# Patient Record
Sex: FEMALE | Race: WHITE | NOT HISPANIC OR LATINO | URBAN - METROPOLITAN AREA
[De-identification: names, ages, dates, MRNs, and addresses within clinical notes are randomized per-mention and may not be internally consistent; named-entity substitution may affect disease eponyms.]

---

## 2019-01-23 ENCOUNTER — EMERGENCY (EMERGENCY)
Facility: HOSPITAL | Age: 69
LOS: 0 days | Discharge: LEFT BEFORE DISCHARGE | End: 2019-01-24
Attending: EMERGENCY MEDICINE | Admitting: EMERGENCY MEDICINE
Payer: COMMERCIAL

## 2019-01-23 VITALS — HEIGHT: 67 IN | WEIGHT: 164.91 LBS

## 2019-01-23 DIAGNOSIS — E78.5 HYPERLIPIDEMIA, UNSPECIFIED: ICD-10-CM

## 2019-01-23 DIAGNOSIS — I10 ESSENTIAL (PRIMARY) HYPERTENSION: ICD-10-CM

## 2019-01-23 LAB
ALBUMIN SERPL ELPH-MCNC: 4.1 G/DL — SIGNIFICANT CHANGE UP (ref 3.3–5)
ALP SERPL-CCNC: 82 U/L — SIGNIFICANT CHANGE UP (ref 40–120)
ALT FLD-CCNC: 23 U/L — SIGNIFICANT CHANGE UP (ref 12–78)
ANION GAP SERPL CALC-SCNC: 7 MMOL/L — SIGNIFICANT CHANGE UP (ref 5–17)
APPEARANCE UR: CLEAR — SIGNIFICANT CHANGE UP
AST SERPL-CCNC: 23 U/L — SIGNIFICANT CHANGE UP (ref 15–37)
BACTERIA # UR AUTO: ABNORMAL
BILIRUB SERPL-MCNC: 0.4 MG/DL — SIGNIFICANT CHANGE UP (ref 0.2–1.2)
BILIRUB UR-MCNC: NEGATIVE — SIGNIFICANT CHANGE UP
BUN SERPL-MCNC: 12 MG/DL — SIGNIFICANT CHANGE UP (ref 7–23)
CALCIUM SERPL-MCNC: 9.8 MG/DL — SIGNIFICANT CHANGE UP (ref 8.5–10.1)
CHLORIDE SERPL-SCNC: 98 MMOL/L — SIGNIFICANT CHANGE UP (ref 96–108)
CO2 SERPL-SCNC: 29 MMOL/L — SIGNIFICANT CHANGE UP (ref 22–31)
COLOR SPEC: YELLOW — SIGNIFICANT CHANGE UP
CREAT SERPL-MCNC: 0.61 MG/DL — SIGNIFICANT CHANGE UP (ref 0.5–1.3)
DIFF PNL FLD: ABNORMAL
EPI CELLS # UR: SIGNIFICANT CHANGE UP
GLUCOSE SERPL-MCNC: 107 MG/DL — HIGH (ref 70–99)
GLUCOSE UR QL: NEGATIVE MG/DL — SIGNIFICANT CHANGE UP
HCT VFR BLD CALC: 41.1 % — SIGNIFICANT CHANGE UP (ref 34.5–45)
HGB BLD-MCNC: 13.2 G/DL — SIGNIFICANT CHANGE UP (ref 11.5–15.5)
KETONES UR-MCNC: NEGATIVE — SIGNIFICANT CHANGE UP
LEUKOCYTE ESTERASE UR-ACNC: ABNORMAL
MAGNESIUM SERPL-MCNC: 2.4 MG/DL — SIGNIFICANT CHANGE UP (ref 1.6–2.6)
MCHC RBC-ENTMCNC: 27.1 PG — SIGNIFICANT CHANGE UP (ref 27–34)
MCHC RBC-ENTMCNC: 32.1 GM/DL — SIGNIFICANT CHANGE UP (ref 32–36)
MCV RBC AUTO: 84.4 FL — SIGNIFICANT CHANGE UP (ref 80–100)
NITRITE UR-MCNC: NEGATIVE — SIGNIFICANT CHANGE UP
NRBC # BLD: 0 /100 WBCS — SIGNIFICANT CHANGE UP (ref 0–0)
PH UR: 7 — SIGNIFICANT CHANGE UP (ref 5–8)
PLATELET # BLD AUTO: 332 K/UL — SIGNIFICANT CHANGE UP (ref 150–400)
POTASSIUM SERPL-MCNC: 4.1 MMOL/L — SIGNIFICANT CHANGE UP (ref 3.5–5.3)
POTASSIUM SERPL-SCNC: 4.1 MMOL/L — SIGNIFICANT CHANGE UP (ref 3.5–5.3)
PROT SERPL-MCNC: 8.5 GM/DL — HIGH (ref 6–8.3)
PROT UR-MCNC: NEGATIVE MG/DL — SIGNIFICANT CHANGE UP
RBC # BLD: 4.87 M/UL — SIGNIFICANT CHANGE UP (ref 3.8–5.2)
RBC # FLD: 15 % — HIGH (ref 10.3–14.5)
RBC CASTS # UR COMP ASSIST: SIGNIFICANT CHANGE UP /HPF (ref 0–4)
SODIUM SERPL-SCNC: 134 MMOL/L — LOW (ref 135–145)
SP GR SPEC: 1.01 — SIGNIFICANT CHANGE UP (ref 1.01–1.02)
TROPONIN I SERPL-MCNC: <0.015 NG/ML — SIGNIFICANT CHANGE UP (ref 0.01–0.04)
TSH SERPL-MCNC: 2.48 UU/ML — SIGNIFICANT CHANGE UP (ref 0.34–4.82)
UROBILINOGEN FLD QL: NEGATIVE MG/DL — SIGNIFICANT CHANGE UP
WBC # BLD: 6.31 K/UL — SIGNIFICANT CHANGE UP (ref 3.8–10.5)
WBC # FLD AUTO: 6.31 K/UL — SIGNIFICANT CHANGE UP (ref 3.8–10.5)
WBC UR QL: SIGNIFICANT CHANGE UP

## 2019-01-23 PROCEDURE — 99284 EMERGENCY DEPT VISIT MOD MDM: CPT | Mod: 25

## 2019-01-23 PROCEDURE — 93010 ELECTROCARDIOGRAM REPORT: CPT

## 2019-01-23 PROCEDURE — 71046 X-RAY EXAM CHEST 2 VIEWS: CPT | Mod: 26

## 2019-01-23 RX ORDER — PITAVASTATIN CALCIUM 1.04 MG/1
1 TABLET, FILM COATED ORAL
Qty: 0 | Refills: 0 | COMMUNITY

## 2019-01-23 RX ORDER — CARVEDILOL PHOSPHATE 80 MG/1
1 CAPSULE, EXTENDED RELEASE ORAL
Qty: 0 | Refills: 0 | COMMUNITY

## 2019-01-23 RX ORDER — AMLODIPINE BESYLATE 2.5 MG/1
1 TABLET ORAL
Qty: 0 | Refills: 0 | COMMUNITY

## 2019-01-23 NOTE — ED STATDOCS - PROGRESS NOTE DETAILS
69 yo female with a PMH of htn presents with high BP. Pt felt slight fatigue while driving to school and had her BP checked wheich was over 200s systolic. Spoke with PMD who rx amlodipine which was taken PTA. Pt feeling slightly anxious but denies cp, sob, headache, n/v/d, f/c/sweating. Will check labs, cpk, EKG, CXR. Reeval. -Ovidio Ribeiro PA-C 67 yo female with a PMH of htn presents with high BP. Pt felt slight fatigue while driving to school and had her BP checked which was over 200s systolic. Spoke with PMD who rx amlodipine which was taken PTA. Pt feeling slightly anxious but denies cp, sob, headache, n/v/d, f/c/sweating. Will check labs, cpk, EKG, CXR. Reeval. -Ovidio Ribeiro PA-C

## 2019-01-23 NOTE — ED ADULT NURSE NOTE - NSIMPLEMENTINTERV_GEN_ALL_ED
Implemented All Universal Safety Interventions:  [place] to call system. Call bell, personal items and telephone within reach. Instruct patient to call for assistance. Room bathroom lighting operational. Non-slip footwear when patient is off stretcher. Physically safe environment: no spills, clutter or unnecessary equipment. Stretcher in lowest position, wheels locked, appropriate side rails in place.

## 2019-01-23 NOTE — ED ADULT NURSE NOTE - OBJECTIVE STATEMENT
Patient states that she has had elevated blood pressure and anxiety all day.  Patient took Carvedilol, Amlodipine and HCTZ. Patient states she felt as if her blood pressure was not under control and her anxiety was increasing so she came to the ED to be checked.  Patient denies headache, dizziness but felt fatigued during and the very anxious.  Color pink, skin warm and dry.

## 2019-01-23 NOTE — ED ADULT TRIAGE NOTE - CHIEF COMPLAINT QUOTE
pt checked BP today after not feeling well. /122 noted. Took BP medication at home. denies chest pain, headache, dizziness, sob.

## 2019-01-23 NOTE — ED STATDOCS - ATTENDING CONTRIBUTION TO CARE
Attending Contribution to Care: I, Salena Kwon, performed the initial face to face bedside interview with this patient regarding history of present illness, review of symptoms and relevant past medical, social and family history.  I completed an independent physical examination.  I was the initial provider who evaluated this patient and the history, physical, and MDM reflect this intial assessment. I have signed out the follow up of any pending tests after the original (i.e. labs, radiological studies) to the ACP with instructions to review any with instructions to review any concerning findings to me prior to discharge.  I have communicated the patient’s plan of care and disposition with the ACP.

## 2019-01-23 NOTE — ED STATDOCS - PROVIDER TOKENS
FREE:[LAST:[Rejibo],FIRST:[Mc],PHONE:[phone]],FAX:[(   [phone]    -],ADDRESS:[[address] [place] [id number]]]

## 2019-01-23 NOTE — ED STATDOCS - OBJECTIVE STATEMENT
Pt is a 69 y/o F, with PMHx of HTN, presenting to the ED with c/o elevated BP today. Pt states she was driving to school, felt fatigued, and had her BP checked by the school nurse and it was elevated at 202/122. Went home and called her PMD, was called in a rx for amlodipine and pt reports taking a dose prior to arrival. States she feels a little anxious. Denies CP, SOB, HA, vision changes, and N/V. Had a routine f/u with her PMD last week, had a normal BP, but reports intermittent episodes of palpitations and started on low dose of metoprolol in addition to her carvedilol.  Yam- PMD

## 2019-01-24 VITALS
TEMPERATURE: 98 F | SYSTOLIC BLOOD PRESSURE: 131 MMHG | HEART RATE: 71 BPM | DIASTOLIC BLOOD PRESSURE: 80 MMHG | RESPIRATION RATE: 18 BRPM | OXYGEN SATURATION: 100 %

## 2023-12-07 NOTE — ED STATDOCS - CARE PROVIDER_API CALL
Prednisone and doxy sent to [facility]  [address], [place] [address]  Phone: [phone]  Fax: (   )    -

## 2024-04-15 PROBLEM — E78.5 HYPERLIPIDEMIA, UNSPECIFIED: Chronic | Status: ACTIVE | Noted: 2019-01-25

## 2024-04-15 PROBLEM — I10 ESSENTIAL (PRIMARY) HYPERTENSION: Chronic | Status: ACTIVE | Noted: 2019-01-25

## 2024-05-07 ENCOUNTER — APPOINTMENT (OUTPATIENT)
Dept: ORTHOPEDIC SURGERY | Facility: CLINIC | Age: 74
End: 2024-05-07
Payer: MEDICARE

## 2024-05-07 VITALS — WEIGHT: 187 LBS | BODY MASS INDEX: 29.35 KG/M2 | HEIGHT: 67 IN

## 2024-05-07 DIAGNOSIS — Z00.00 ENCOUNTER FOR GENERAL ADULT MEDICAL EXAMINATION W/OUT ABNORMAL FINDINGS: ICD-10-CM

## 2024-05-07 DIAGNOSIS — M17.11 UNILATERAL PRIMARY OSTEOARTHRITIS, RIGHT KNEE: ICD-10-CM

## 2024-05-07 DIAGNOSIS — M17.0 BILATERAL PRIMARY OSTEOARTHRITIS OF KNEE: ICD-10-CM

## 2024-05-07 PROCEDURE — 20610 DRAIN/INJ JOINT/BURSA W/O US: CPT | Mod: RT

## 2024-05-07 PROCEDURE — 73564 X-RAY EXAM KNEE 4 OR MORE: CPT | Mod: 50

## 2024-05-07 PROCEDURE — 99204 OFFICE O/P NEW MOD 45 MIN: CPT | Mod: 25

## 2024-05-07 NOTE — HISTORY OF PRESENT ILLNESS
[de-identified] : This is a very nice  73 year old  female experiencing worsening pain in the right knee which is severe in intensity and has been going on for at least 3 months now. The pain substantially limits activities of daily living. Walking tolerance is reduced. Medication and activity modification have been minimally effective for a period lasting greater than three months in duration. Assistive devices and external support were not deemed by the patient to be helpful in improving their function. The patient denies any radiation of the pain to the feet and it is not associated with numbness, tingling, or weakness.

## 2024-05-07 NOTE — DISCUSSION/SUMMARY
[de-identified] : The patient has bilateral knee osteoarthritis. They are not an appropriate candidate for surgical intervention at this time as there has not been any non-surgical  treatment. An extensive discussion was conducted on the natural history of the disease and the variety of surgical and non-surgical options available to the patient including, but not limited to non-steroidal anti-inflammatory medications, steroid injections, physical therapy, maintenance of ideal body weight, and reduction of activity. I recommended a prescription of Mobic but the patient would prefer to use OTC NSAIDs at this time. The patient will schedule an appointment as needed.  Informed consent for right knee injection  was obtained.  All risks, benefits and alternatives were discussed. These included but were not limited to bleeding, infection and allergic reaction. All questions were answered. A time out was performed. Right  knee were prepped and draped in sterile fashion. Using sterile technique, 40mg of Kenalog, 4cc of 1% lidocaine, 4cc of 0.25% marcaine using a 21-gauge needle. A sterile dressing was applied. Post injection instructions were reviewed. The patient tolerated the procedure well.

## 2024-05-07 NOTE — PHYSICAL EXAM
[de-identified] : Well developed, well nourished in no apparent distress, awake, alert and orientated to person, place and time. with appropriate mood and affect.  Respirations are even and unlabored. Gait evaluation does reveal a limp. There is no inguinal adenopathy.  The right limb is well-perfused, without skin lesions, shows a grossly normal motor and sensory examination.  Knee motion does cause pain. ROM of the knee is  degrees.  5 degrees varus The knee is stable within that range-of-motion to AP and ML stress.  Muscle strength is normal. Pedal pulses are palpable.  The left limb is well-perfused, without skin lesions, shows a grossly normal motor and sensory examination.  Knee motion does cause pain. ROM of the knee is 20-90 degrees.  5 degrees varus The knee is stable within that range-of-motion to AP and ML stress.  Muscle strength is normal. Pedal pulses are palpable.  [de-identified] :  Long standing  knee, AP knee, lateral knee, tunnel and patellar views of the both knees were ordered and taken in the office and demonstrate degenerative joint disease of the knee with joint space narrowing, osteophyte formation, and subchondral sclerosis. Screws noted in distal femur on left.

## 2024-08-23 ENCOUNTER — APPOINTMENT (OUTPATIENT)
Dept: ORTHOPEDIC SURGERY | Facility: CLINIC | Age: 74
End: 2024-08-23
Payer: MEDICARE

## 2024-08-23 ENCOUNTER — TRANSCRIPTION ENCOUNTER (OUTPATIENT)
Age: 74
End: 2024-08-23

## 2024-08-23 VITALS — WEIGHT: 187 LBS | BODY MASS INDEX: 29.35 KG/M2 | HEIGHT: 67 IN

## 2024-08-23 DIAGNOSIS — M17.0 BILATERAL PRIMARY OSTEOARTHRITIS OF KNEE: ICD-10-CM

## 2024-08-23 PROCEDURE — 99214 OFFICE O/P EST MOD 30 MIN: CPT | Mod: 25

## 2024-08-23 PROCEDURE — 20610 DRAIN/INJ JOINT/BURSA W/O US: CPT | Mod: 50

## 2024-08-23 NOTE — HISTORY OF PRESENT ILLNESS
[de-identified] : This is a very nice  73 year old  female experiencing worsening pain in the right knee which is severe in intensity and has been going on for at least 3 months now.  Cortisone injections have been helping, temporarily.  The pain substantially limits activities of daily living. Walking tolerance is reduced. Medication and activity modification have been minimally effective for a period lasting greater than three months in duration. Assistive devices and external support were not deemed by the patient to be helpful in improving their function. The patient denies any radiation of the pain to the feet and it is not associated with numbness, tingling, or weakness.

## 2024-08-23 NOTE — DISCUSSION/SUMMARY
[de-identified] : The patient has bilateral knee osteoarthritis. They are not an appropriate candidate for surgical intervention at this time as there has not been any non-surgical  treatment. An extensive discussion was conducted on the natural history of the disease and the variety of surgical and non-surgical options available to the patient including, but not limited to non-steroidal anti-inflammatory medications, steroid injections, physical therapy, maintenance of ideal body weight, and reduction of activity. I recommended a prescription of Mobic but the patient would prefer to use OTC NSAIDs at this time. The patient will schedule an appointment as needed.  Informed consent for bilat knee injections was obtained.  All risks, benefits and alternatives were discussed. These included but were not limited to bleeding, infection and allergic reaction. All questions were answered. A time out was performed. Both  knees were prepped and draped in sterile fashion. Using sterile technique, 40mg of Kenalog, 4cc of 1% lidocaine, 4cc of 0.25% marcaine using a 21-gauge needle. A sterile dressing was applied. Post injection instructions were reviewed. The patient tolerated the procedure well.

## 2024-08-23 NOTE — PHYSICAL EXAM
[de-identified] : Well developed, well nourished in no apparent distress, awake, alert and orientated to person, place and time. with appropriate mood and affect.  Respirations are even and unlabored. Gait evaluation does reveal a limp. There is no inguinal adenopathy.  The right limb is well-perfused, without skin lesions, shows a grossly normal motor and sensory examination.  Knee motion does cause pain. ROM of the knee is  degrees.  5 degrees varus The knee is stable within that range-of-motion to AP and ML stress.  Muscle strength is normal. Pedal pulses are palpable.  The left limb is well-perfused, without skin lesions, shows a grossly normal motor and sensory examination.  Knee motion does cause pain. ROM of the knee is 20-90 degrees.  5 degrees varus The knee is stable within that range-of-motion to AP and ML stress.  Muscle strength is normal. Pedal pulses are palpable.

## 2024-10-02 ENCOUNTER — APPOINTMENT (OUTPATIENT)
Dept: ORTHOPEDIC SURGERY | Facility: CLINIC | Age: 74
End: 2024-10-02
Payer: MEDICARE

## 2024-10-02 VITALS — HEIGHT: 67 IN | WEIGHT: 187 LBS | BODY MASS INDEX: 29.35 KG/M2

## 2024-10-02 DIAGNOSIS — M17.0 BILATERAL PRIMARY OSTEOARTHRITIS OF KNEE: ICD-10-CM

## 2024-10-02 PROCEDURE — 99215 OFFICE O/P EST HI 40 MIN: CPT

## 2024-10-02 PROCEDURE — 73560 X-RAY EXAM OF KNEE 1 OR 2: CPT | Mod: RT

## 2024-10-02 PROCEDURE — 73564 X-RAY EXAM KNEE 4 OR MORE: CPT | Mod: LT

## 2024-10-02 PROCEDURE — G2211 COMPLEX E/M VISIT ADD ON: CPT

## 2024-10-02 NOTE — DISCUSSION/SUMMARY
[de-identified] : The patient has severe bilateral knee osteoarthritis. She has tried and failed a course of conservative management would like to proceed with left total knee arthroplasty with robotic navigation for assistance.  She elected to defer any intervention for the right knee today.  The patient is an appropriate candidate for consideration of left total knee replacement. An extensive discussion was conducted of the natural history of the disease and the variety of surgical and non-surgical treatment options available to the patient. A risk/benefit analysis was discussed with the patient reviewing the advantages and disadvantages of surgical intervention at this time. Both the level and length of the patient's pain have made additional conservative treatment measures consisting of NSAIDs, physical therapy, corticosteroids, and/or viscosupplementation contraindicated. A full explanation was given of the nature and the purpose of the procedure and anesthesia, its benefits, possible alternative methods of diagnosis or treatment, the risks involved, the possibility of complications, the foreseeable consequences of the procedure and the possible results of the non-treatment. I reviewed the plan of care as well as used a model of a total knee implant equivalent to the one that will be used for their total knee joint replacement. The ability to secure the implant utilizing cement or cementless (press-fit) was discussed with the patient. The patient agrees with the plan of care, as well as the use of implants for their total knee replacement.   We also discussed that if robotic/computer navigation is utilized, then additional incisions may need to be made to accommodate the computer navigation arrays, which will be placed in the femur and tibia.  I did explain to the patient that their range of motion may not significantly improve after the surgery given the preoperative contracture. The patient expressed understanding of this and I promised her that I may not be able to improve this and she is in total understanding of this.  And has elected to undergo total knee arthroplasty.  No guarantee or assurance was made as to the results that may be obtained. Specifically, the risks were identified to include, but are not limited to the following: Infection, phlebitis, pulmonary embolism, death, paralysis, dislocation, pain, stiffness, instability, limp, weakness, breakage, leg-length inequality, uncontrolled bleeding, nerve injury, blood vessel injury, pressure sores, anesthetic risks, delayed healing of wound and bone, and wear and loosening. Further discussion was undertaken with the patient about the details of surgical preparation, treatment, and postoperative rehabilitation including medical clearance, autotransfusion, the hospital course, and the postoperative rehabilitation involved. All in all, I feel that this patient is a good candidate for surgical reconstruction.  The patient and I discussed the option for 23 hour stay joint replacement. They will stay overnight in the hospital after surgery and will discharge home on the next day of surgery. The risks and benefits of this type of rapid recovery protocol was reviewed with the patient and they are in agreement with proceeding with 23 hour stay joint replacement surgery. They understand that in the event of an emergency, that they will be transferred to the main hospital for inpatient care.

## 2024-10-02 NOTE — PHYSICAL EXAM
[de-identified] : Well developed, well nourished in no apparent distress, awake, alert and orientated to person, place and time. with appropriate mood and affect.  Respirations are even and unlabored. Gait evaluation does reveal a limp. There is no inguinal adenopathy.  The right limb is well-perfused, without skin lesions, shows a grossly normal motor and sensory examination.  Knee motion does cause pain. ROM of the knee is  degrees.  5 degrees varus The knee is stable within that range-of-motion to AP and ML stress.  Muscle strength is normal. Pedal pulses are palpable.  The left limb is well-perfused, without skin lesions, shows a grossly normal motor and sensory examination.  Knee motion does cause pain. ROM of the knee is 20-80 degrees.  5 degrees varus. Healed scar. The knee is stable within that range-of-motion to AP and ML stress.  Muscle strength is normal. Pedal pulses are palpable.  [de-identified] : Long standing knee, AP knee, lateral knee, and patellar views of the left knee were ordered and taken in the office and demonstrate degenerative joint disease of the knee with joint space narrowing, osteophyte formation, and subchondral sclerosis. AP radiograph of the right knee was ordered and obtained in the office and demonstrates right knee osteoarthritis.

## 2024-10-02 NOTE — HISTORY OF PRESENT ILLNESS
[de-identified] : This is a very nice 73 year old female experiencing worsening pain in the bilateral knee which is severe in intensity and has been going on for at least 6 months now.  Cortisone injections have helped in the past.  The pain substantially limits activities of daily living. Walking tolerance is reduced. Medication and activity modification have been minimally effective for a period lasting greater than three months in duration. Assistive devices and external support were not deemed by the patient to be helpful in improving their function. The patient denies any radiation of the pain to the feet and it is not associated with numbness, tingling, or weakness. She has had prior surgery on the left knee and ended up with a contracture on the left knee.

## 2024-12-16 ENCOUNTER — NON-APPOINTMENT (OUTPATIENT)
Age: 74
End: 2024-12-16

## 2025-01-06 ENCOUNTER — OUTPATIENT (OUTPATIENT)
Dept: OUTPATIENT SERVICES | Facility: HOSPITAL | Age: 75
LOS: 1 days | End: 2025-01-06
Payer: MEDICARE

## 2025-01-06 VITALS
WEIGHT: 190.92 LBS | SYSTOLIC BLOOD PRESSURE: 141 MMHG | OXYGEN SATURATION: 98 % | DIASTOLIC BLOOD PRESSURE: 70 MMHG | RESPIRATION RATE: 16 BRPM | TEMPERATURE: 98 F | HEIGHT: 67 IN | HEART RATE: 85 BPM

## 2025-01-06 DIAGNOSIS — S42.301A UNSPECIFIED FRACTURE OF SHAFT OF HUMERUS, RIGHT ARM, INITIAL ENCOUNTER FOR CLOSED FRACTURE: Chronic | ICD-10-CM

## 2025-01-06 DIAGNOSIS — Z01.818 ENCOUNTER FOR OTHER PREPROCEDURAL EXAMINATION: ICD-10-CM

## 2025-01-06 DIAGNOSIS — Z98.890 OTHER SPECIFIED POSTPROCEDURAL STATES: Chronic | ICD-10-CM

## 2025-01-06 DIAGNOSIS — Z29.9 ENCOUNTER FOR PROPHYLACTIC MEASURES, UNSPECIFIED: ICD-10-CM

## 2025-01-06 DIAGNOSIS — M17.12 UNILATERAL PRIMARY OSTEOARTHRITIS, LEFT KNEE: ICD-10-CM

## 2025-01-06 DIAGNOSIS — I10 ESSENTIAL (PRIMARY) HYPERTENSION: ICD-10-CM

## 2025-01-06 LAB
A1C WITH ESTIMATED AVERAGE GLUCOSE RESULT: 6.1 % — HIGH (ref 4–5.6)
ANION GAP SERPL CALC-SCNC: 13 MMOL/L — SIGNIFICANT CHANGE UP (ref 5–17)
BUN SERPL-MCNC: 18 MG/DL — SIGNIFICANT CHANGE UP (ref 7–23)
CALCIUM SERPL-MCNC: 9.4 MG/DL — SIGNIFICANT CHANGE UP (ref 8.4–10.5)
CHLORIDE SERPL-SCNC: 101 MMOL/L — SIGNIFICANT CHANGE UP (ref 96–108)
CO2 SERPL-SCNC: 27 MMOL/L — SIGNIFICANT CHANGE UP (ref 22–31)
CREAT SERPL-MCNC: 0.55 MG/DL — SIGNIFICANT CHANGE UP (ref 0.5–1.3)
EGFR: 97 ML/MIN/1.73M2 — SIGNIFICANT CHANGE UP
ESTIMATED AVERAGE GLUCOSE: 128 MG/DL — HIGH (ref 68–114)
GLUCOSE SERPL-MCNC: 102 MG/DL — HIGH (ref 70–99)
HCT VFR BLD CALC: 41.1 % — SIGNIFICANT CHANGE UP (ref 34.5–45)
HGB BLD-MCNC: 12.9 G/DL — SIGNIFICANT CHANGE UP (ref 11.5–15.5)
MCHC RBC-ENTMCNC: 27.9 PG — SIGNIFICANT CHANGE UP (ref 27–34)
MCHC RBC-ENTMCNC: 31.4 G/DL — LOW (ref 32–36)
MCV RBC AUTO: 89 FL — SIGNIFICANT CHANGE UP (ref 80–100)
MRSA PCR RESULT.: SIGNIFICANT CHANGE UP
NRBC # BLD: 0 /100 WBCS — SIGNIFICANT CHANGE UP (ref 0–0)
PLATELET # BLD AUTO: 227 K/UL — SIGNIFICANT CHANGE UP (ref 150–400)
POTASSIUM SERPL-MCNC: 3.9 MMOL/L — SIGNIFICANT CHANGE UP (ref 3.5–5.3)
POTASSIUM SERPL-SCNC: 3.9 MMOL/L — SIGNIFICANT CHANGE UP (ref 3.5–5.3)
RBC # BLD: 4.62 M/UL — SIGNIFICANT CHANGE UP (ref 3.8–5.2)
RBC # FLD: 14.9 % — HIGH (ref 10.3–14.5)
S AUREUS DNA NOSE QL NAA+PROBE: SIGNIFICANT CHANGE UP
SODIUM SERPL-SCNC: 141 MMOL/L — SIGNIFICANT CHANGE UP (ref 135–145)
WBC # BLD: 5.01 K/UL — SIGNIFICANT CHANGE UP (ref 3.8–10.5)
WBC # FLD AUTO: 5.01 K/UL — SIGNIFICANT CHANGE UP (ref 3.8–10.5)

## 2025-01-06 PROCEDURE — 87640 STAPH A DNA AMP PROBE: CPT

## 2025-01-06 PROCEDURE — G0463: CPT

## 2025-01-06 PROCEDURE — 73700 CT LOWER EXTREMITY W/O DYE: CPT | Mod: 26,LT

## 2025-01-06 PROCEDURE — 36415 COLL VENOUS BLD VENIPUNCTURE: CPT

## 2025-01-06 PROCEDURE — 85027 COMPLETE CBC AUTOMATED: CPT

## 2025-01-06 PROCEDURE — 73700 CT LOWER EXTREMITY W/O DYE: CPT | Mod: MC

## 2025-01-06 PROCEDURE — 80048 BASIC METABOLIC PNL TOTAL CA: CPT

## 2025-01-06 PROCEDURE — 87641 MR-STAPH DNA AMP PROBE: CPT

## 2025-01-06 PROCEDURE — 83036 HEMOGLOBIN GLYCOSYLATED A1C: CPT

## 2025-01-06 RX ORDER — LOSARTAN POTASSIUM AND HYDROCHLOROTHIAZIDE 100; 25 MG/1; MG/1
0 TABLET, FILM COATED ORAL
Refills: 0 | DISCHARGE

## 2025-01-06 RX ORDER — ROSUVASTATIN 40 MG/1
1 TABLET, FILM COATED ORAL
Refills: 0 | DISCHARGE

## 2025-01-06 NOTE — H&P PST ADULT - ASSESSMENT
DASI score:  DASI activity:  Loose teeth or denture:      CAPRINI SCORE    AGE RELATED RISK FACTORS                                                             [ ] Age 41-60 years                                            (1 Point)  [xx ] Age: 61-74 years                                           (2 Points)                 [ ] Age= 75 years                                                (3 Points)             DISEASE RELATED RISK FACTORS                                                       [ ] Edema in the lower extremities                 (1 Point)                     [ ] Varicose veins                                               (1 Point)                                 [x ] BMI > 25 Kg/m2                                            (1 Point)                                  [ ] Serious infection (ie PNA)                            (1 Point)                     [ ] Lung disease ( COPD, Emphysema)            (1 Point)                                                                          [ ] Acute myocardial infarction                         (1 Point)                  [ ] Congestive heart failure (in the previous month)  (1 Point)         [ ] Inflammatory bowel disease                            (1 Point)                  [ ] Central venous access, PICC or Port               (2 points)       (within the last month)                                                                [ ] Stroke (in the previous month)                        (5 Points)    [ ] Previous or present malignancy                       (2 points)                                                                                                                                                         HEMATOLOGY RELATED FACTORS                                                         [ ] Prior episodes of VTE                                     (3 Points)                     [ ] Positive family history for VTE                      (3 Points)                  [ ] Prothrombin 20731 A                                     (3 Points)                     [ ] Factor V Leiden                                                (3 Points)                        [ ] Lupus anticoagulants                                      (3 Points)                                                           [ ] Anticardiolipin antibodies                              (3 Points)                                                       [ ] High homocysteine in the blood                   (3 Points)                                             [ ] Other congenital or acquired thrombophilia      (3 Points)                                                [ ] Heparin induced thrombocytopenia                  (3 Points)                                        MOBILITY RELATED FACTORS  [ ] Bed rest                                                         (1 Point)  [ ] Plaster cast                                                    (2 points)  [ ] Bed bound for more than 72 hours           (2 Points)    GENDER SPECIFIC FACTORS  [ ] Pregnancy or had a baby within the last month   (1 Point)  [ ] Post-partum < 6 weeks                                   (1 Point)  [ ] Hormonal therapy  or oral contraception   (1 Point)  [ ] History of pregnancy complications              (1 point)  [ ] Unexplained or recurrent              (1 Point)    OTHER RISK FACTORS                                           (1 Point)  [ ] BMI >40, smoking, diabetes requiring insulin, chemotherapy  blood transfusions and length of surgery over 2 hours    SURGERY RELATED RISK FACTORS  [ ]  Section within the last month     (1 Point)  [ ] Minor surgery                                                  (1 Point)  [ ] Arthroscopic surgery                                       (2 Points)  [ ] Planned major surgery lasting more            (2 Points)      than 45 minutes     [x ] Elective hip or knee joint replacement       (5 points)       surgery                                                TRAUMA RELATED RISK FACTORS  [ ] Fracture of the hip, pelvis, or leg                       (5 Points)  [ ] Spinal cord injury resulting in paralysis             (5 points)       (in the previous month)    [ ] Paralysis  (less than 1 month)                             (5 Points)  [ ] Multiple Trauma within 1 month                        (5 Points)    Total Score [   8   ]    Caprini Score 0-2: Low Risk, NO VTE prophylaxis required for most patients, encourage ambulation  Caprini Score 3-6: Moderate Risk , pharmacologic VTE prophylaxis is indicated for most patients (in the absence of contraindications)  Caprini Score Greater than or =7: High risk, pharmocologic VTE prophylaxis indicated for most patients (in the absence of contraindications)                               DASI score:  DASI activity:  Loose teeth or denture:denies       CAPRINI SCORE    AGE RELATED RISK FACTORS                                                             [ ] Age 41-60 years                                            (1 Point)  [xx ] Age: 61-74 years                                           (2 Points)                 [ ] Age= 75 years                                                (3 Points)             DISEASE RELATED RISK FACTORS                                                       [ ] Edema in the lower extremities                 (1 Point)                     [ ] Varicose veins                                               (1 Point)                                 [x ] BMI > 25 Kg/m2                                            (1 Point)                                  [ ] Serious infection (ie PNA)                            (1 Point)                     [ ] Lung disease ( COPD, Emphysema)            (1 Point)                                                                          [ ] Acute myocardial infarction                         (1 Point)                  [ ] Congestive heart failure (in the previous month)  (1 Point)         [ ] Inflammatory bowel disease                            (1 Point)                  [ ] Central venous access, PICC or Port               (2 points)       (within the last month)                                                                [ ] Stroke (in the previous month)                        (5 Points)    [ ] Previous or present malignancy                       (2 points)                                                                                                                                                         HEMATOLOGY RELATED FACTORS                                                         [ ] Prior episodes of VTE                                     (3 Points)                     [ ] Positive family history for VTE                      (3 Points)                  [ ] Prothrombin 15997 A                                     (3 Points)                     [ ] Factor V Leiden                                                (3 Points)                        [ ] Lupus anticoagulants                                      (3 Points)                                                           [ ] Anticardiolipin antibodies                              (3 Points)                                                       [ ] High homocysteine in the blood                   (3 Points)                                             [ ] Other congenital or acquired thrombophilia      (3 Points)                                                [ ] Heparin induced thrombocytopenia                  (3 Points)                                        MOBILITY RELATED FACTORS  [ ] Bed rest                                                         (1 Point)  [ ] Plaster cast                                                    (2 points)  [ ] Bed bound for more than 72 hours           (2 Points)    GENDER SPECIFIC FACTORS  [ ] Pregnancy or had a baby within the last month   (1 Point)  [ ] Post-partum < 6 weeks                                   (1 Point)  [ ] Hormonal therapy  or oral contraception   (1 Point)  [ ] History of pregnancy complications              (1 point)  [ ] Unexplained or recurrent              (1 Point)    OTHER RISK FACTORS                                           (1 Point)  [ ] BMI >40, smoking, diabetes requiring insulin, chemotherapy  blood transfusions and length of surgery over 2 hours    SURGERY RELATED RISK FACTORS  [ ]  Section within the last month     (1 Point)  [ ] Minor surgery                                                  (1 Point)  [ ] Arthroscopic surgery                                       (2 Points)  [ ] Planned major surgery lasting more            (2 Points)      than 45 minutes     [x ] Elective hip or knee joint replacement       (5 points)       surgery                                                TRAUMA RELATED RISK FACTORS  [ ] Fracture of the hip, pelvis, or leg                       (5 Points)  [ ] Spinal cord injury resulting in paralysis             (5 points)       (in the previous month)    [ ] Paralysis  (less than 1 month)                             (5 Points)  [ ] Multiple Trauma within 1 month                        (5 Points)    Total Score [   8   ]    Caprini Score 0-2: Low Risk, NO VTE prophylaxis required for most patients, encourage ambulation  Caprini Score 3-6: Moderate Risk , pharmacologic VTE prophylaxis is indicated for most patients (in the absence of contraindications)  Caprini Score Greater than or =7: High risk, pharmocologic VTE prophylaxis indicated for most patients (in the absence of contraindications)                               DASI score:6.05  DASI activity: cooking cleaning grocery walking limited due to pain  Loose teeth or denture: denies       CAPRINI SCORE    AGE RELATED RISK FACTORS                                                             [ ] Age 41-60 years                                            (1 Point)  [xx ] Age: 61-74 years                                           (2 Points)                 [ ] Age= 75 years                                                (3 Points)             DISEASE RELATED RISK FACTORS                                                       [ ] Edema in the lower extremities                 (1 Point)                     [ ] Varicose veins                                               (1 Point)                                 [x ] BMI > 25 Kg/m2                                            (1 Point)                                  [ ] Serious infection (ie PNA)                            (1 Point)                     [ ] Lung disease ( COPD, Emphysema)            (1 Point)                                                                          [ ] Acute myocardial infarction                         (1 Point)                  [ ] Congestive heart failure (in the previous month)  (1 Point)         [ ] Inflammatory bowel disease                            (1 Point)                  [ ] Central venous access, PICC or Port               (2 points)       (within the last month)                                                                [ ] Stroke (in the previous month)                        (5 Points)    [ ] Previous or present malignancy                       (2 points)                                                                                                                                                         HEMATOLOGY RELATED FACTORS                                                         [ ] Prior episodes of VTE                                     (3 Points)                     [ ] Positive family history for VTE                      (3 Points)                  [ ] Prothrombin 98701 A                                     (3 Points)                     [ ] Factor V Leiden                                                (3 Points)                        [ ] Lupus anticoagulants                                      (3 Points)                                                           [ ] Anticardiolipin antibodies                              (3 Points)                                                       [ ] High homocysteine in the blood                   (3 Points)                                             [ ] Other congenital or acquired thrombophilia      (3 Points)                                                [ ] Heparin induced thrombocytopenia                  (3 Points)                                        MOBILITY RELATED FACTORS  [ ] Bed rest                                                         (1 Point)  [ ] Plaster cast                                                    (2 points)  [ ] Bed bound for more than 72 hours           (2 Points)    GENDER SPECIFIC FACTORS  [ ] Pregnancy or had a baby within the last month   (1 Point)  [ ] Post-partum < 6 weeks                                   (1 Point)  [ ] Hormonal therapy  or oral contraception   (1 Point)  [ ] History of pregnancy complications              (1 point)  [ ] Unexplained or recurrent              (1 Point)    OTHER RISK FACTORS                                           (1 Point)  [ ] BMI >40, smoking, diabetes requiring insulin, chemotherapy  blood transfusions and length of surgery over 2 hours    SURGERY RELATED RISK FACTORS  [ ]  Section within the last month     (1 Point)  [ ] Minor surgery                                                  (1 Point)  [ ] Arthroscopic surgery                                       (2 Points)  [ ] Planned major surgery lasting more            (2 Points)      than 45 minutes     [x ] Elective hip or knee joint replacement       (5 points)       surgery                                                TRAUMA RELATED RISK FACTORS  [ ] Fracture of the hip, pelvis, or leg                       (5 Points)  [ ] Spinal cord injury resulting in paralysis             (5 points)       (in the previous month)    [ ] Paralysis  (less than 1 month)                             (5 Points)  [ ] Multiple Trauma within 1 month                        (5 Points)    Total Score [   8   ]    Caprini Score 0-2: Low Risk, NO VTE prophylaxis required for most patients, encourage ambulation  Caprini Score 3-6: Moderate Risk , pharmacologic VTE prophylaxis is indicated for most patients (in the absence of contraindications)  Caprini Score Greater than or =7: High risk, pharmocologic VTE prophylaxis indicated for most patients (in the absence of contraindications)

## 2025-01-06 NOTE — H&P PST ADULT - NSICDXPASTSURGICALHX_GEN_ALL_CORE_FT
PAST SURGICAL HISTORY:  No significant past surgical history      PAST SURGICAL HISTORY:  Bilateral arm fractures     H/O eye surgery     History of left knee surgery

## 2025-01-06 NOTE — H&P PST ADULT - ENT GEN HX ROS MEA POS PC
occasional tinnitus; occasional gum bleeding scheduled for  dental cleaning tomorrow./tinnitus/gum bleeding occasional tinnitus; occasional gum bleeding scheduled for  dental cleaning tomorrow./tinnitus/post-nasal discharge/gum bleeding

## 2025-01-06 NOTE — H&P PST ADULT - ANESTHESIA, PREVIOUS REACTION, PROFILE
Scott Gomes  VASCULAR SURGERY  [address], [address]  [place] [address]  Phone: [phone]  Fax: [phone]  Follow Up Time: 2 weeks
none

## 2025-01-06 NOTE — H&P PST ADULT - NSICDXPASTMEDICALHX_GEN_ALL_CORE_FT
PAST MEDICAL HISTORY:  HLD (hyperlipidemia)     HTN (hypertension)      PAST MEDICAL HISTORY:  HLD (hyperlipidemia)     HTN (hypertension)     OA (osteoarthritis)

## 2025-01-06 NOTE — H&P PST ADULT - HISTORY OF PRESENT ILLNESS
This is a 74 year old female with PMH; Htn, Hld     Presenting to PST prior to scheduled Left total knee arthroplasty with karthik robot on 1/29/25 with Dr. Guillen.     This is a 74 year old female with PMH; Htn, Hld and bilateral knee arthritis.    Presenting to [facility] prior to scheduled Left total knee arthroplasty with karthik robot on 1/29/25 with Dr. Guillen.      HEATHER VILLEGAS is a 73 year old female being seen for a follow-up visit for   osteoarthritis of the knee.   History of Present Illness  History of Present Illness   This is a very nice 73 year old female experiencing worsening pain in the bilateral knee which is severe in intensity  and has been going on for at least 6 months now. Cortisone injections have helped in the past. The pain   substantially limits activities of daily living. Walking tolerance is reduced. Medication and activity modification have   been minimally effective for a period lasting greater than three months in duration. Assistive devices and external   support were not deemed by the patient to be helpful in improving their function. The patient denies any radiation   of the pain to the feet and it is not associated with numbness, tingling, or weakness. She has had prior surgery on   the left knee and ended up with a contracture on the left knee.     This is a 74 year old female with PMH; Htn, Hld and bilateral knee arthritis.  Experiencing worsening pain in the bilateral knee which is severe in intensity  and has been going on for at least 6 months now. Cortisone injections have helped in the past ( last injection_____. The pain   substantially limits activities of daily living. Walking tolerance is reduced. Medication and activity modification have   been minimally effective for a period lasting greater than three months in duration.  The patient denies any radiation   of the pain to the feet and it is not associated with numbness, tingling, or weakness. She has had prior surgery on   the left knee and ended up with a contracture on the left knee.      Presenting to PST prior to scheduled Left total knee arthroplasty with karthik robot on 1/29/25 with Dr. Guillen.         This is a 74 year old female with PMH; Htn, Hld and bilateral knee arthritis.Experiencing worsening pain in the bilateral knee which is severe in intensity and has been going on for at least 6 months now. Cortisone injections have helped in the past ( last injection 5 month ago. The pain substantially limits activities of daily living. Walking tolerance is reduced. Medication and activity modification have been minimally effective for a period lasting greater than three months in duration.  Denies any radiation of the pain to the feet and it is not associated with numbness, tingling, or weakness. Denies any recent falls or injuries.   She has had prior surgery on  the left knee and ended up with a contracture on the left knee.      Presenting to PST prior to scheduled Left total knee arthroplasty with karthik robot on 1/29/25 with Dr. Guillen.    Goal of surgery:"To  be able to walk better"  Have you had a CT KARTHIK done? NO. -ordered at PST       This is a 74 year old female with PMH; Htn, Hld and bilateral knee arthritis. Patient endorses worsening pain in the bilateral knee which is severe in intensity x  6 months now.She has had prior surgery on  the left knee and ended up with a contracture on the left knee. Denies any radiation of the pain to the feet and it is not associated with numbness, tingling, or weakness.Pain limits activities of daily living. Walking tolerance is reduced.  Denies any recent falls or injuries. Cortisone injections have helped in the past ( last injection 5 month ago).  Presenting to PST prior to scheduled Left total knee arthroplasty with karthik robot on 1/29/25 with Dr. Guillen.      Goal of surgery  "To be able to walk better"  Have you had a CT KARTHIK done? NO. -ordered at PST

## 2025-01-06 NOTE — H&P PST ADULT - PROBLEM SELECTOR PLAN 1
Scheduled for Left Total Knee Arthroplasty with Harrison robot on 1/29/25 with   Pre-operative instructions and chlorhexidine given.  Labs: cbc, bmp, A1c ,MRSA/MSSA drawn in PST  Non con Left knee CT ordered

## 2025-01-14 ENCOUNTER — NON-APPOINTMENT (OUTPATIENT)
Age: 75
End: 2025-01-14

## 2025-01-22 DIAGNOSIS — Z96.652 PRESENCE OF LEFT ARTIFICIAL KNEE JOINT: ICD-10-CM

## 2025-01-22 RX ORDER — TRAMADOL HYDROCHLORIDE 50 MG/1
50 TABLET, COATED ORAL
Qty: 28 | Refills: 0 | Status: ACTIVE | COMMUNITY
Start: 2025-01-22 | End: 1900-01-01

## 2025-01-22 RX ORDER — OXYCODONE 5 MG/1
5 TABLET ORAL
Qty: 28 | Refills: 0 | Status: ACTIVE | COMMUNITY
Start: 2025-01-22 | End: 1900-01-01

## 2025-01-22 RX ORDER — PANTOPRAZOLE 40 MG/1
40 TABLET, DELAYED RELEASE ORAL DAILY
Qty: 30 | Refills: 1 | Status: ACTIVE | COMMUNITY
Start: 2025-01-22 | End: 1900-01-01

## 2025-01-22 RX ORDER — NAPROXEN 500 MG/1
500 TABLET ORAL
Qty: 60 | Refills: 2 | Status: ACTIVE | COMMUNITY
Start: 2025-01-22 | End: 1900-01-01

## 2025-01-22 RX ORDER — ASPIRIN ENTERIC COATED TABLETS 81 MG 81 MG/1
81 TABLET, DELAYED RELEASE ORAL
Qty: 60 | Refills: 0 | Status: ACTIVE | COMMUNITY
Start: 2025-01-22 | End: 1900-01-01

## 2025-01-24 PROBLEM — M19.90 UNSPECIFIED OSTEOARTHRITIS, UNSPECIFIED SITE: Chronic | Status: ACTIVE | Noted: 2025-01-06

## 2025-01-29 ENCOUNTER — APPOINTMENT (OUTPATIENT)
Dept: ORTHOPEDIC SURGERY | Facility: HOSPITAL | Age: 75
End: 2025-01-29

## 2025-02-04 ENCOUNTER — APPOINTMENT (OUTPATIENT)
Dept: ORTHOPEDIC SURGERY | Facility: CLINIC | Age: 75
End: 2025-02-04
Payer: MEDICARE

## 2025-02-04 VITALS — WEIGHT: 192 LBS | BODY MASS INDEX: 30.13 KG/M2 | HEIGHT: 67 IN

## 2025-02-04 DIAGNOSIS — M17.11 UNILATERAL PRIMARY OSTEOARTHRITIS, RIGHT KNEE: ICD-10-CM

## 2025-02-04 DIAGNOSIS — M17.0 BILATERAL PRIMARY OSTEOARTHRITIS OF KNEE: ICD-10-CM

## 2025-02-04 DIAGNOSIS — Z96.652 PRESENCE OF LEFT ARTIFICIAL KNEE JOINT: ICD-10-CM

## 2025-02-04 PROCEDURE — 20610 DRAIN/INJ JOINT/BURSA W/O US: CPT | Mod: RT

## 2025-02-04 PROCEDURE — 73564 X-RAY EXAM KNEE 4 OR MORE: CPT | Mod: 50

## 2025-02-04 PROCEDURE — 99215 OFFICE O/P EST HI 40 MIN: CPT | Mod: 25

## 2025-03-14 ENCOUNTER — NON-APPOINTMENT (OUTPATIENT)
Age: 75
End: 2025-03-14

## 2025-03-31 ENCOUNTER — OUTPATIENT (OUTPATIENT)
Dept: OUTPATIENT SERVICES | Facility: HOSPITAL | Age: 75
LOS: 1 days | End: 2025-03-31
Payer: MEDICARE

## 2025-03-31 VITALS
HEIGHT: 67 IN | WEIGHT: 186.07 LBS | OXYGEN SATURATION: 98 % | SYSTOLIC BLOOD PRESSURE: 144 MMHG | HEART RATE: 79 BPM | TEMPERATURE: 98 F | RESPIRATION RATE: 16 BRPM | DIASTOLIC BLOOD PRESSURE: 83 MMHG

## 2025-03-31 DIAGNOSIS — Z98.890 OTHER SPECIFIED POSTPROCEDURAL STATES: Chronic | ICD-10-CM

## 2025-03-31 DIAGNOSIS — M17.12 UNILATERAL PRIMARY OSTEOARTHRITIS, LEFT KNEE: ICD-10-CM

## 2025-03-31 DIAGNOSIS — S42.301A UNSPECIFIED FRACTURE OF SHAFT OF HUMERUS, RIGHT ARM, INITIAL ENCOUNTER FOR CLOSED FRACTURE: Chronic | ICD-10-CM

## 2025-03-31 DIAGNOSIS — Z01.818 ENCOUNTER FOR OTHER PREPROCEDURAL EXAMINATION: ICD-10-CM

## 2025-03-31 LAB
A1C WITH ESTIMATED AVERAGE GLUCOSE RESULT: 6.4 % — HIGH (ref 4–5.6)
ANION GAP SERPL CALC-SCNC: 12 MMOL/L — SIGNIFICANT CHANGE UP (ref 5–17)
BUN SERPL-MCNC: 12 MG/DL — SIGNIFICANT CHANGE UP (ref 7–23)
CALCIUM SERPL-MCNC: 9.6 MG/DL — SIGNIFICANT CHANGE UP (ref 8.4–10.5)
CHLORIDE SERPL-SCNC: 102 MMOL/L — SIGNIFICANT CHANGE UP (ref 96–108)
CO2 SERPL-SCNC: 27 MMOL/L — SIGNIFICANT CHANGE UP (ref 22–31)
CREAT SERPL-MCNC: 0.54 MG/DL — SIGNIFICANT CHANGE UP (ref 0.5–1.3)
EGFR: 98 ML/MIN/1.73M2 — SIGNIFICANT CHANGE UP
EGFR: 98 ML/MIN/1.73M2 — SIGNIFICANT CHANGE UP
ESTIMATED AVERAGE GLUCOSE: 137 MG/DL — HIGH (ref 68–114)
GLUCOSE SERPL-MCNC: 119 MG/DL — HIGH (ref 70–99)
HCT VFR BLD CALC: 40.2 % — SIGNIFICANT CHANGE UP (ref 34.5–45)
HGB BLD-MCNC: 12.9 G/DL — SIGNIFICANT CHANGE UP (ref 11.5–15.5)
MCHC RBC-ENTMCNC: 28 PG — SIGNIFICANT CHANGE UP (ref 27–34)
MCHC RBC-ENTMCNC: 32.1 G/DL — SIGNIFICANT CHANGE UP (ref 32–36)
MCV RBC AUTO: 87.2 FL — SIGNIFICANT CHANGE UP (ref 80–100)
MRSA PCR RESULT.: SIGNIFICANT CHANGE UP
NRBC BLD AUTO-RTO: 0 /100 WBCS — SIGNIFICANT CHANGE UP (ref 0–0)
PLATELET # BLD AUTO: 206 K/UL — SIGNIFICANT CHANGE UP (ref 150–400)
POTASSIUM SERPL-MCNC: 3.6 MMOL/L — SIGNIFICANT CHANGE UP (ref 3.5–5.3)
POTASSIUM SERPL-SCNC: 3.6 MMOL/L — SIGNIFICANT CHANGE UP (ref 3.5–5.3)
RBC # BLD: 4.61 M/UL — SIGNIFICANT CHANGE UP (ref 3.8–5.2)
RBC # FLD: 15.2 % — HIGH (ref 10.3–14.5)
S AUREUS DNA NOSE QL NAA+PROBE: SIGNIFICANT CHANGE UP
SODIUM SERPL-SCNC: 141 MMOL/L — SIGNIFICANT CHANGE UP (ref 135–145)
WBC # BLD: 5.34 K/UL — SIGNIFICANT CHANGE UP (ref 3.8–10.5)
WBC # FLD AUTO: 5.34 K/UL — SIGNIFICANT CHANGE UP (ref 3.8–10.5)

## 2025-03-31 PROCEDURE — 83036 HEMOGLOBIN GLYCOSYLATED A1C: CPT

## 2025-03-31 PROCEDURE — G0463: CPT

## 2025-03-31 PROCEDURE — 87640 STAPH A DNA AMP PROBE: CPT

## 2025-03-31 PROCEDURE — 87641 MR-STAPH DNA AMP PROBE: CPT

## 2025-03-31 PROCEDURE — 80048 BASIC METABOLIC PNL TOTAL CA: CPT

## 2025-03-31 PROCEDURE — 85027 COMPLETE CBC AUTOMATED: CPT

## 2025-03-31 PROCEDURE — 36415 COLL VENOUS BLD VENIPUNCTURE: CPT

## 2025-03-31 RX ORDER — SODIUM CHLORIDE 9 G/1000ML
1000 INJECTION, SOLUTION INTRAVENOUS
Refills: 0 | Status: DISCONTINUED | OUTPATIENT
Start: 2025-04-23 | End: 2025-05-07

## 2025-03-31 NOTE — H&P PST ADULT - ASSESSMENT
DASI score:  DASI activity:  Loose teeth or denture: Denies   MP:    DASI score: 6.2 METS, limited due to knee pain  DASI activity: walking   Loose teeth or denture: Denies   MP: Class 2

## 2025-03-31 NOTE — H&P PST ADULT - NSICDXPASTSURGICALHX_GEN_ALL_CORE_FT
PAST SURGICAL HISTORY:  Bilateral arm fractures     H/O eye surgery     History of left knee surgery

## 2025-03-31 NOTE — H&P PST ADULT - HEIGHT IN INCHES
Pt here for nurse visit and Cam Patch applied.     Pt education provided:   1.) No swimming or submerging Cam Patch in water.  2.) Ok to shower, but try to limit amount of water exposure if possible by turning back to shower when able to ensure adhesive lasts for the duration of the ordered wear time.   3.) Ordered wear time.  4.) When to manually press button and log symptoms in diary.  5.) How to remove patch.   6.) Return Cam Patch via mail or drop off at [facility], [address].     Patient verbalized understanding.          7

## 2025-03-31 NOTE — H&P PST ADULT - HISTORY OF PRESENT ILLNESS
This is a 74 year old female with PMH; Htn, Hld and bilateral knee arthritis. Patient endorses worsening pain in the bilateral knee which is severe in intensity x  6 months now.She has had prior surgery on  the left knee and ended up with a contracture on the left knee. Denies any radiation of the pain to the feet and it is not associated with numbness, tingling, or weakness.Pain limits activities of daily living. Walking tolerance is reduced.  Denies any recent falls or injuries. Cortisone injections have helped in the past ( last injection 5 month ago).  Presenting to PST prior to scheduled Left total knee arthroplasty with karthik robot on 1/29/25 with Dr. Guillen.     73 y/o F with PMHx significant for HTN, HLD, Osteoarthritis reports she has been experiencing progressively worsening left knee pain over the past 1 year approximately. She has attempted conservative management and Cortisone injections in the past however does not alleviate her symptoms. Patient is now scheduled for Left Total Knee Arthroplasty with Harrison Day with Dr. Guillen on 04/23/2025.

## 2025-03-31 NOTE — H&P PST ADULT - REASON FOR ADMISSION
Left Total Knee Arthroplasty W/ Harrison  Goal: I want to walk without pain  Have you had a CT HARRISON done? Yes Left Total Knee Arthroplasty W/ Harrison  Goal: I want to walk without pain  Have you had a CT HARRISON done? Yes 01/06/20205

## 2025-03-31 NOTE — H&P PST ADULT - PROBLEM SELECTOR PLAN 1
Scheduled for Left Total Knee Arthroplasty w/ Harrison Robot  Pre-procedure labs collected. PST instructions provided. Patient verbalized understanding of instructions. Pre-procedure labs collected. Surgical soap given to patient  PST instructions provided. Patient verbalized understanding of instructions.

## 2025-04-08 ENCOUNTER — NON-APPOINTMENT (OUTPATIENT)
Age: 75
End: 2025-04-08

## 2025-04-16 NOTE — ED STATDOCS - EYES, MLM
SURVEY:     You may be receiving a survey from [facility] regarding your visit today.     Please complete the survey to enable us to provide the highest quality of care to you and your family.     If you cannot score us a very good on any question, please call the office to discuss how we could have made your experience a very good one.     Thank you,    Eusebio Bullock, APRN-CNP  Teresa Henderson, APRN-CNP  Niki, LPN  Jailyn, CMA  Patel, CMA  Alma, CMA  [place], PCA  Norma, CMA  Vilma, PM    clear bilaterally.  Pupils equal, round, and reactive to light.

## 2025-04-17 DIAGNOSIS — Z96.652 PRESENCE OF LEFT ARTIFICIAL KNEE JOINT: ICD-10-CM

## 2025-04-17 RX ORDER — TRAMADOL HYDROCHLORIDE 50 MG/1
50 TABLET, COATED ORAL
Qty: 28 | Refills: 0 | Status: ACTIVE | COMMUNITY
Start: 2025-04-17 | End: 1900-01-01

## 2025-04-17 RX ORDER — ASPIRIN 81 MG/1
81 TABLET ORAL
Qty: 60 | Refills: 0 | Status: ACTIVE | COMMUNITY
Start: 2025-04-17 | End: 1900-01-01

## 2025-04-17 RX ORDER — OXYCODONE 5 MG/1
5 TABLET ORAL
Qty: 28 | Refills: 0 | Status: ACTIVE | COMMUNITY
Start: 2025-04-17 | End: 1900-01-01

## 2025-04-17 RX ORDER — NAPROXEN 500 MG/1
500 TABLET ORAL
Qty: 60 | Refills: 2 | Status: ACTIVE | COMMUNITY
Start: 2025-04-17 | End: 1900-01-01

## 2025-04-17 RX ORDER — PANTOPRAZOLE 40 MG/1
40 TABLET, DELAYED RELEASE ORAL DAILY
Qty: 30 | Refills: 1 | Status: ACTIVE | COMMUNITY
Start: 2025-04-17 | End: 1900-01-01

## 2025-04-23 ENCOUNTER — OUTPATIENT (OUTPATIENT)
Dept: OUTPATIENT SERVICES | Facility: HOSPITAL | Age: 75
LOS: 1 days | End: 2025-04-23
Payer: MEDICARE

## 2025-04-23 ENCOUNTER — TRANSCRIPTION ENCOUNTER (OUTPATIENT)
Age: 75
End: 2025-04-23

## 2025-04-23 ENCOUNTER — APPOINTMENT (OUTPATIENT)
Dept: ORTHOPEDIC SURGERY | Facility: HOSPITAL | Age: 75
End: 2025-04-23

## 2025-04-23 ENCOUNTER — RESULT REVIEW (OUTPATIENT)
Age: 75
End: 2025-04-23

## 2025-04-23 VITALS
HEIGHT: 67 IN | RESPIRATION RATE: 16 BRPM | OXYGEN SATURATION: 98 % | HEART RATE: 76 BPM | TEMPERATURE: 97 F | SYSTOLIC BLOOD PRESSURE: 156 MMHG | WEIGHT: 186.07 LBS | DIASTOLIC BLOOD PRESSURE: 91 MMHG

## 2025-04-23 DIAGNOSIS — S42.301A UNSPECIFIED FRACTURE OF SHAFT OF HUMERUS, RIGHT ARM, INITIAL ENCOUNTER FOR CLOSED FRACTURE: Chronic | ICD-10-CM

## 2025-04-23 DIAGNOSIS — Z98.890 OTHER SPECIFIED POSTPROCEDURAL STATES: Chronic | ICD-10-CM

## 2025-04-23 DIAGNOSIS — M17.12 UNILATERAL PRIMARY OSTEOARTHRITIS, LEFT KNEE: ICD-10-CM

## 2025-04-23 PROCEDURE — 97607 NEG PRS WND THR NDME<=50SQCM: CPT

## 2025-04-23 PROCEDURE — 20985 CPTR-ASST DIR MS PX: CPT

## 2025-04-23 PROCEDURE — 73560 X-RAY EXAM OF KNEE 1 OR 2: CPT | Mod: 26,LT

## 2025-04-23 PROCEDURE — 27447 TOTAL KNEE ARTHROPLASTY: CPT | Mod: LT,22

## 2025-04-23 DEVICE — IMPLANTABLE DEVICE: Type: IMPLANTABLE DEVICE | Site: LEFT | Status: FUNCTIONAL

## 2025-04-23 DEVICE — MAKO BONE PIN 4MM X 140MM: Type: IMPLANTABLE DEVICE | Site: LEFT | Status: FUNCTIONAL

## 2025-04-23 DEVICE — STEM CMNTD TRIATHLON TS 12X50MM: Type: IMPLANTABLE DEVICE | Site: LEFT | Status: FUNCTIONAL

## 2025-04-23 DEVICE — CEMENT SIMPLEX P 40GM: Type: IMPLANTABLE DEVICE | Site: LEFT | Status: FUNCTIONAL

## 2025-04-23 DEVICE — IMP PATELLA SYMMETRIC X3 33X9MM: Type: IMPLANTABLE DEVICE | Site: LEFT | Status: FUNCTIONAL

## 2025-04-23 DEVICE — BASEPLATE TIB UNIV TRIATHLON SZ 5: Type: IMPLANTABLE DEVICE | Site: LEFT | Status: FUNCTIONAL

## 2025-04-23 DEVICE — MAKO BONE PIN 4MM X 110MM: Type: IMPLANTABLE DEVICE | Site: LEFT | Status: FUNCTIONAL

## 2025-04-23 RX ORDER — ACETAMINOPHEN 500 MG/5ML
3 LIQUID (ML) ORAL
Qty: 45 | Refills: 0
Start: 2025-04-23 | End: 2025-04-27

## 2025-04-23 RX ORDER — ASPIRIN 325 MG
81 TABLET ORAL EVERY 12 HOURS
Refills: 0 | Status: DISCONTINUED | OUTPATIENT
Start: 2025-04-23 | End: 2025-05-07

## 2025-04-23 RX ORDER — ROSUVASTATIN CALCIUM 20 MG/1
20 TABLET, FILM COATED ORAL AT BEDTIME
Refills: 0 | Status: DISCONTINUED | OUTPATIENT
Start: 2025-04-23 | End: 2025-05-07

## 2025-04-23 RX ORDER — BISACODYL 5 MG
10 TABLET, DELAYED RELEASE (ENTERIC COATED) ORAL ONCE
Refills: 0 | Status: DISCONTINUED | OUTPATIENT
Start: 2025-04-25 | End: 2025-05-07

## 2025-04-23 RX ORDER — ASPIRIN 325 MG
1 TABLET ORAL
Qty: 60 | Refills: 0
Start: 2025-04-23 | End: 2025-05-22

## 2025-04-23 RX ORDER — CALCIUM CARBONATE/VITAMIN D3 500MG-5MCG
1 TABLET ORAL THREE TIMES A DAY
Refills: 0 | Status: DISCONTINUED | OUTPATIENT
Start: 2025-04-23 | End: 2025-05-07

## 2025-04-23 RX ORDER — CEFAZOLIN SODIUM IN 0.9 % NACL 3 G/100 ML
2000 INTRAVENOUS SOLUTION, PIGGYBACK (ML) INTRAVENOUS ONCE
Refills: 0 | Status: COMPLETED | OUTPATIENT
Start: 2025-04-23 | End: 2025-04-23

## 2025-04-23 RX ORDER — POLYETHYLENE GLYCOL 3350 17 G/17G
17 POWDER, FOR SOLUTION ORAL AT BEDTIME
Refills: 0 | Status: DISCONTINUED | OUTPATIENT
Start: 2025-04-23 | End: 2025-05-07

## 2025-04-23 RX ORDER — OXYCODONE HYDROCHLORIDE 30 MG/1
5 TABLET ORAL EVERY 4 HOURS
Refills: 0 | Status: DISCONTINUED | OUTPATIENT
Start: 2025-04-23 | End: 2025-04-23

## 2025-04-23 RX ORDER — LIDOCAINE HCL/PF 10 MG/ML
0.2 VIAL (ML) INJECTION ONCE
Refills: 0 | Status: COMPLETED | OUTPATIENT
Start: 2025-04-23 | End: 2025-04-23

## 2025-04-23 RX ORDER — SENNA 187 MG
2 TABLET ORAL AT BEDTIME
Refills: 0 | Status: DISCONTINUED | OUTPATIENT
Start: 2025-04-23 | End: 2025-05-07

## 2025-04-23 RX ORDER — TRAMADOL HYDROCHLORIDE 50 MG/1
1 TABLET, FILM COATED ORAL
Qty: 20 | Refills: 0
Start: 2025-04-23 | End: 2025-04-27

## 2025-04-23 RX ORDER — B1/B2/B3/B5/B6/B12/VIT C/FOLIC 500-0.5 MG
1 TABLET ORAL DAILY
Refills: 0 | Status: DISCONTINUED | OUTPATIENT
Start: 2025-04-23 | End: 2025-05-07

## 2025-04-23 RX ORDER — TRAMADOL HYDROCHLORIDE 50 MG/1
50 TABLET, FILM COATED ORAL ONCE
Refills: 0 | Status: DISCONTINUED | OUTPATIENT
Start: 2025-04-23 | End: 2025-04-23

## 2025-04-23 RX ORDER — ACETAMINOPHEN 500 MG/5ML
1000 LIQUID (ML) ORAL ONCE
Refills: 0 | Status: DISCONTINUED | OUTPATIENT
Start: 2025-04-24 | End: 2025-05-07

## 2025-04-23 RX ORDER — CARVEDILOL 3.12 MG/1
12.5 TABLET, FILM COATED ORAL EVERY 12 HOURS
Refills: 0 | Status: DISCONTINUED | OUTPATIENT
Start: 2025-04-23 | End: 2025-05-07

## 2025-04-23 RX ORDER — OXYCODONE HYDROCHLORIDE 30 MG/1
1 TABLET ORAL
Qty: 20 | Refills: 0
Start: 2025-04-23 | End: 2025-04-27

## 2025-04-23 RX ORDER — MAGNESIUM, ALUMINUM HYDROXIDE 200-200 MG
30 TABLET,CHEWABLE ORAL
Refills: 0 | Status: DISCONTINUED | OUTPATIENT
Start: 2025-04-23 | End: 2025-05-07

## 2025-04-23 RX ORDER — CEFAZOLIN SODIUM IN 0.9 % NACL 3 G/100 ML
2000 INTRAVENOUS SOLUTION, PIGGYBACK (ML) INTRAVENOUS EVERY 8 HOURS
Refills: 0 | Status: COMPLETED | OUTPATIENT
Start: 2025-04-23 | End: 2025-04-24

## 2025-04-23 RX ORDER — LOSARTAN POTASSIUM 100 MG/1
50 TABLET, FILM COATED ORAL DAILY
Refills: 0 | Status: DISCONTINUED | OUTPATIENT
Start: 2025-04-23 | End: 2025-05-07

## 2025-04-23 RX ORDER — ACETAMINOPHEN 500 MG/5ML
1000 LIQUID (ML) ORAL ONCE
Refills: 0 | Status: COMPLETED | OUTPATIENT
Start: 2025-04-23 | End: 2025-04-23

## 2025-04-23 RX ORDER — DEXAMETHASONE 0.5 MG/1
8 TABLET ORAL ONCE
Refills: 0 | Status: COMPLETED | OUTPATIENT
Start: 2025-04-24 | End: 2025-04-24

## 2025-04-23 RX ORDER — MAGNESIUM HYDROXIDE 400 MG/5ML
30 SUSPENSION ORAL DAILY
Refills: 0 | Status: DISCONTINUED | OUTPATIENT
Start: 2025-04-23 | End: 2025-05-07

## 2025-04-23 RX ORDER — FOLIC ACID 1 MG/1
1 TABLET ORAL DAILY
Refills: 0 | Status: DISCONTINUED | OUTPATIENT
Start: 2025-04-23 | End: 2025-05-07

## 2025-04-23 RX ORDER — KETOROLAC TROMETHAMINE 30 MG/ML
15 INJECTION, SOLUTION INTRAMUSCULAR; INTRAVENOUS EVERY 6 HOURS
Refills: 0 | Status: COMPLETED | OUTPATIENT
Start: 2025-04-23 | End: 2025-04-24

## 2025-04-23 RX ORDER — OXYCODONE HYDROCHLORIDE 30 MG/1
10 TABLET ORAL EVERY 4 HOURS
Refills: 0 | Status: DISCONTINUED | OUTPATIENT
Start: 2025-04-23 | End: 2025-04-23

## 2025-04-23 RX ORDER — TRAMADOL HYDROCHLORIDE 50 MG/1
50 TABLET, FILM COATED ORAL EVERY 6 HOURS
Refills: 0 | Status: DISCONTINUED | OUTPATIENT
Start: 2025-04-23 | End: 2025-04-23

## 2025-04-23 RX ORDER — ACETAMINOPHEN 500 MG/5ML
1000 LIQUID (ML) ORAL ONCE
Refills: 0 | Status: COMPLETED | OUTPATIENT
Start: 2025-04-24 | End: 2025-04-24

## 2025-04-23 RX ORDER — ONDANSETRON HCL/PF 4 MG/2 ML
4 VIAL (ML) INJECTION EVERY 6 HOURS
Refills: 0 | Status: DISCONTINUED | OUTPATIENT
Start: 2025-04-23 | End: 2025-05-07

## 2025-04-23 RX ORDER — ACETAMINOPHEN 500 MG/5ML
1000 LIQUID (ML) ORAL ONCE
Refills: 0 | Status: DISCONTINUED | OUTPATIENT
Start: 2025-04-23 | End: 2025-05-07

## 2025-04-23 RX ORDER — NAPROXEN SODIUM 275 MG
1 TABLET ORAL
Qty: 10 | Refills: 0
Start: 2025-04-23 | End: 2025-04-27

## 2025-04-23 RX ADMIN — Medication 1 APPLICATION(S): at 12:35

## 2025-04-23 RX ADMIN — TRAMADOL HYDROCHLORIDE 50 MILLIGRAM(S): 50 TABLET, FILM COATED ORAL at 12:36

## 2025-04-23 RX ADMIN — KETOROLAC TROMETHAMINE 15 MILLIGRAM(S): 30 INJECTION, SOLUTION INTRAMUSCULAR; INTRAVENOUS at 20:05

## 2025-04-23 RX ADMIN — SODIUM CHLORIDE 100 MILLILITER(S): 9 INJECTION, SOLUTION INTRAVENOUS at 12:35

## 2025-04-23 RX ADMIN — Medication 3 MILLILITER(S): at 22:02

## 2025-04-23 RX ADMIN — Medication 100 MILLIGRAM(S): at 22:39

## 2025-04-23 RX ADMIN — ROSUVASTATIN CALCIUM 20 MILLIGRAM(S): 20 TABLET, FILM COATED ORAL at 21:37

## 2025-04-23 RX ADMIN — Medication 500 MILLILITER(S): at 22:04

## 2025-04-23 RX ADMIN — Medication 81 MILLIGRAM(S): at 20:05

## 2025-04-23 RX ADMIN — SODIUM CHLORIDE 100 MILLILITER(S): 9 INJECTION, SOLUTION INTRAVENOUS at 22:04

## 2025-04-23 RX ADMIN — Medication 3 MILLILITER(S): at 12:28

## 2025-04-23 RX ADMIN — Medication 1000 MILLIGRAM(S): at 22:40

## 2025-04-23 RX ADMIN — Medication 400 MILLIGRAM(S): at 22:39

## 2025-04-23 RX ADMIN — Medication 40 MILLIGRAM(S): at 12:35

## 2025-04-23 NOTE — OCCUPATIONAL THERAPY INITIAL EVALUATION ADULT - WEIGHT-BEARING RESTRICTIONS: SIT/STAND, REHAB EVAL
Quality 226: Preventive Care And Screening: Tobacco Use: Screening And Cessation Intervention: Patient screened for tobacco use and is an ex/non-smoker Detail Level: Detailed Quality 130: Documentation Of Current Medications In The Medical Record: Current Medications Documented weight-bearing as tolerated

## 2025-04-23 NOTE — ASU DISCHARGE PLAN (ADULT/PEDIATRIC) - CARE PROVIDER_API CALL
Renato Guillen  Joint Reconstruction  [address], [address]  [place] [address]  Phone: [phone]  Fax: [phone]  Follow Up Time:

## 2025-04-23 NOTE — PHYSICAL THERAPY INITIAL EVALUATION ADULT - ASSISTIVE DEVICE FOR TRANSFER: GAIT, REHAB EVAL
Test Reason : 

Blood Pressure : ***/*** mmHG

Vent. Rate : 111 BPM     Atrial Rate : 111 BPM

   P-R Int : 000 ms          QRS Dur : 162 ms

    QT Int : 374 ms       P-R-T Axes : [phone] degrees

   QTc Int : 508 ms

 

SINUS TACHYCARDIA

LEFT BUNDLE BRANCH BLOCK

ABNORMAL ECG

WHEN COMPARED WITH ECG OF 15-MAY-2017 22:09,

NO SIGNIFICANT CHANGE WAS FOUND

Confirmed by STANISLAV RODRIGEZ MD ([id number]) on 5/17/2017 2:38:38 PM

 

Referred By:             Confirmed By:STANISLAV RODRIGEZ MD rolling walker

## 2025-04-23 NOTE — PHYSICAL THERAPY INITIAL EVALUATION ADULT - PERTINENT HX OF CURRENT PROBLEM, REHAB EVAL
74 y.o. F PMH HTN, HLD, Osteoarthritis reports she has been experiencing progressively worsening left knee pain over the past 1 year approximately. She has attempted conservative management and Cortisone injections in the past however does not alleviate her symptoms. Now s/p L total knee replacement on 4/23/25.

## 2025-04-23 NOTE — OCCUPATIONAL THERAPY INITIAL EVALUATION ADULT - ADDITIONAL COMMENTS
pt reports lives in private home with daughter, 5 steps to enter, additional steps inside, tub shower. Prior to admission Ind with ADLs/ambulating, no AD/DME. Owns RW

## 2025-04-23 NOTE — ASU PREOP CHECKLIST - PATIENT PROBLEMS/NEEDS
Patient expressed no known problems or needs pt presents to ED due to dizziness. Hx of anemia as per pt. Denies chest pain, sob, nausea and vomiting.

## 2025-04-23 NOTE — ASU PATIENT PROFILE, ADULT - FALL HARM RISK - HARM RISK INTERVENTIONS

## 2025-04-23 NOTE — ASU DISCHARGE PLAN (ADULT/PEDIATRIC) - FINANCIAL ASSISTANCE
[facility] provides services at a reduced cost to those who are determined to be eligible through [facility]’s financial assistance program. Information regarding [facility]’s financial assistance program can be found by going to [email or web address] or by calling [phone].

## 2025-04-23 NOTE — OCCUPATIONAL THERAPY INITIAL EVALUATION ADULT - PERTINENT HX OF CURRENT PROBLEM, REHAB EVAL
73 y/o F with PMHx significant for HTN, HLD, Osteoarthritis reports she has been experiencing progressively worsening left knee pain over the past 1 year approximately. She has attempted conservative management and Cortisone injections in the past however does not alleviate her symptoms. Patient is now scheduled for Left Total Knee Arthroplasty with [facility] with Dr. Guillen on 04/23/2025. now s/p L TKA 4/23

## 2025-04-23 NOTE — ASU DISCHARGE PLAN (ADULT/PEDIATRIC) - ASU DC SPECIAL INSTRUCTIONSFT
1.	Pain Control  2.	Walking with full weight bearing as tolerated, with assistive devices (walker/Cane as Needed)  3.	DVT Prophylaxis- Aspirin 81mg PO BID x 30 days  4.	Tylenol, oxyIR, tramadol as needed for pain;   5.	Follow up with Dr. Guillen as Outpatient in 14 Days after Discharge from the Hospital. Call Office For Appointment.   6.	Follow exercise program given to you by Dr. Guillen.   7.	Ice affected area as Needed  8.	Keep Dressing Clean and dry. Do not remove until POD #10, please follow the date as written on your aquacel dressing. You may shower with the dressing on, do not submerge in water

## 2025-04-23 NOTE — ASU DISCHARGE PLAN (ADULT/PEDIATRIC) - NS MD DC FALL RISK RISK
For information on Fall & Injury Prevention, visit: [email or web address] OR  [email or web address] OR  https://www.cdc.gov/steadi/patient.html

## 2025-04-23 NOTE — CHART NOTE - NSCHARTNOTEFT_GEN_A_CORE
Patient seen in PACU resting without complaints.  No Chest Pain, SOB, N/V.    T(C): 36 (04-23-25 @ 17:30), Max: 36.3 (04-23-25 @ 11:14)  HR: 65 (04-23-25 @ 18:30) (55 - 76)  BP: 154/81 (04-23-25 @ 18:30) (107/62 - 157/76)  RR: 18 (04-23-25 @ 18:30) (16 - 18)  SpO2: 97% (04-23-25 @ 18:30) (96% - 98%)  Wt(kg): --    Exam:  Alert and Oriented, No Acute Distress  Laterality: LLE knee aquacel in place, C/D/I  Calves soft, non-tender bilaterally  +PF/DF/EHL/FHL  SILT  + DP pulse      A/P: 74yFemale S/p L TKA . VSS. NAD  -PT/OT-WBAT LLE  -IS encouraged  -DVT PPx with A81 BID  -Pain Control  -23 hr stay    Myra Angel PA-C  Team Pager #[phone] Patient seen in PACU resting without complaints.  No Chest Pain, SOB, N/V.    T(C): 36 (04-23-25 @ 17:30), Max: 36.3 (04-23-25 @ 11:14)  HR: 65 (04-23-25 @ 18:30) (55 - 76)  BP: 154/81 (04-23-25 @ 18:30) (107/62 - 157/76)  RR: 18 (04-23-25 @ 18:30) (16 - 18)  SpO2: 97% (04-23-25 @ 18:30) (96% - 98%)  Wt(kg): --    Exam:  Alert and Oriented, No Acute Distress  Laterality: LLE knee prevena in place maintaining good suction  Calves soft, non-tender bilaterally  +PF/DF/EHL/FHL  SILT  + DP pulse      A/P: 74yFemale S/p L TKA . VSS. NAD  -PT/OT-WBAT LLE  -IS encouraged  -DVT PPx with A81 BID  -Pain Control  -23 hr stay    Myra Angel PA-C  Team Pager #[phone]

## 2025-04-23 NOTE — PHYSICAL THERAPY INITIAL EVALUATION ADULT - ADDITIONAL COMMENTS
Pt resides in a pvt home w/ daughter, 5 steps to enter, additional steps inside. PTA pt was independent with all mobility & ADL's. Did not use an AD for ambulation. Owns RW.

## 2025-04-23 NOTE — ASU DISCHARGE PLAN (ADULT/PEDIATRIC) - ***IN THE EVENT THAT YOU DEVELOP A COMPLICATION AND YOU ARE UNABLE TO REACH YOUR OWN PHYSICIAN, YOU MAY CONTACT:
Patient has 40-pack-year smoking history, currently smoking actively at this time  CT lung cancer screening ordered at prior visit, study has been performed awaiting interpretation  Patient endorses interest in tobacco cessation, various pharmacotherapy outlined with patient who opted for trial of Chantix    Plan:  Chantix prescribed to patient with appropriate up titration  Patient advised of side effects of medication  Referred to tobacco cessation support group  Return to clinic in 1 month for Chantix tolerance and escalation to full dose  Plan for 6-month course of medication   Statement Selected

## 2025-04-24 ENCOUNTER — NON-APPOINTMENT (OUTPATIENT)
Age: 75
End: 2025-04-24

## 2025-04-24 VITALS
SYSTOLIC BLOOD PRESSURE: 154 MMHG | OXYGEN SATURATION: 96 % | DIASTOLIC BLOOD PRESSURE: 80 MMHG | RESPIRATION RATE: 18 BRPM | HEART RATE: 72 BPM

## 2025-04-24 PROCEDURE — C1713: CPT

## 2025-04-24 PROCEDURE — 20985 CPTR-ASST DIR MS PX: CPT

## 2025-04-24 PROCEDURE — 97161 PT EVAL LOW COMPLEX 20 MIN: CPT

## 2025-04-24 PROCEDURE — 97165 OT EVAL LOW COMPLEX 30 MIN: CPT

## 2025-04-24 PROCEDURE — 97530 THERAPEUTIC ACTIVITIES: CPT

## 2025-04-24 PROCEDURE — 82962 GLUCOSE BLOOD TEST: CPT

## 2025-04-24 PROCEDURE — C1776: CPT

## 2025-04-24 PROCEDURE — S2900: CPT

## 2025-04-24 PROCEDURE — 27447 TOTAL KNEE ARTHROPLASTY: CPT | Mod: LT

## 2025-04-24 PROCEDURE — 73560 X-RAY EXAM OF KNEE 1 OR 2: CPT

## 2025-04-24 PROCEDURE — 97116 GAIT TRAINING THERAPY: CPT

## 2025-04-24 RX ADMIN — KETOROLAC TROMETHAMINE 15 MILLIGRAM(S): 30 INJECTION, SOLUTION INTRAMUSCULAR; INTRAVENOUS at 01:34

## 2025-04-24 RX ADMIN — KETOROLAC TROMETHAMINE 15 MILLIGRAM(S): 30 INJECTION, SOLUTION INTRAMUSCULAR; INTRAVENOUS at 07:52

## 2025-04-24 RX ADMIN — Medication 400 MILLIGRAM(S): at 05:13

## 2025-04-24 RX ADMIN — Medication 500 MILLILITER(S): at 06:00

## 2025-04-24 RX ADMIN — Medication 1000 MILLIGRAM(S): at 05:14

## 2025-04-24 RX ADMIN — Medication 3 MILLILITER(S): at 05:14

## 2025-04-24 RX ADMIN — DEXAMETHASONE 101.6 MILLIGRAM(S): 0.5 TABLET ORAL at 05:10

## 2025-04-24 RX ADMIN — KETOROLAC TROMETHAMINE 15 MILLIGRAM(S): 30 INJECTION, SOLUTION INTRAMUSCULAR; INTRAVENOUS at 07:51

## 2025-04-24 RX ADMIN — Medication 40 MILLIGRAM(S): at 05:11

## 2025-04-24 RX ADMIN — Medication 100 MILLIGRAM(S): at 07:52

## 2025-04-24 RX ADMIN — Medication 81 MILLIGRAM(S): at 05:12

## 2025-04-30 ENCOUNTER — NON-APPOINTMENT (OUTPATIENT)
Age: 75
End: 2025-04-30

## 2025-05-03 ENCOUNTER — TRANSCRIPTION ENCOUNTER (OUTPATIENT)
Age: 75
End: 2025-05-03

## 2025-05-06 ENCOUNTER — NON-APPOINTMENT (OUTPATIENT)
Age: 75
End: 2025-05-06

## 2025-05-07 ENCOUNTER — NON-APPOINTMENT (OUTPATIENT)
Age: 75
End: 2025-05-07

## 2025-05-08 ENCOUNTER — APPOINTMENT (OUTPATIENT)
Dept: ORTHOPEDIC SURGERY | Facility: CLINIC | Age: 75
End: 2025-05-08
Payer: MEDICARE

## 2025-05-08 VITALS — HEIGHT: 67 IN | BODY MASS INDEX: 30.13 KG/M2 | WEIGHT: 192 LBS

## 2025-05-08 DIAGNOSIS — Z96.652 PRESENCE OF LEFT ARTIFICIAL KNEE JOINT: ICD-10-CM

## 2025-05-08 PROCEDURE — 99024 POSTOP FOLLOW-UP VISIT: CPT

## 2025-05-08 PROCEDURE — 73564 X-RAY EXAM KNEE 4 OR MORE: CPT | Mod: 26,LT

## 2025-05-08 RX ORDER — TRAMADOL HYDROCHLORIDE 50 MG/1
50 TABLET, COATED ORAL
Qty: 28 | Refills: 0 | Status: ACTIVE | COMMUNITY
Start: 2025-05-08 | End: 1900-01-01

## 2025-05-28 ENCOUNTER — NON-APPOINTMENT (OUTPATIENT)
Age: 75
End: 2025-05-28

## 2025-06-03 ENCOUNTER — APPOINTMENT (OUTPATIENT)
Dept: ORTHOPEDIC SURGERY | Facility: CLINIC | Age: 75
End: 2025-06-03
Payer: MEDICARE

## 2025-06-03 VITALS — HEIGHT: 67 IN | WEIGHT: 192 LBS | BODY MASS INDEX: 30.13 KG/M2

## 2025-06-03 VITALS — BODY MASS INDEX: 30.13 KG/M2 | WEIGHT: 192 LBS | HEIGHT: 67 IN

## 2025-06-03 DIAGNOSIS — Z96.652 PRESENCE OF LEFT ARTIFICIAL KNEE JOINT: ICD-10-CM

## 2025-06-03 PROCEDURE — 99024 POSTOP FOLLOW-UP VISIT: CPT

## 2025-07-07 ENCOUNTER — NON-APPOINTMENT (OUTPATIENT)
Age: 75
End: 2025-07-07

## 2025-08-08 ENCOUNTER — APPOINTMENT (OUTPATIENT)
Dept: ORTHOPEDIC SURGERY | Facility: CLINIC | Age: 75
End: 2025-08-08
Payer: MEDICARE

## 2025-08-08 VITALS — HEIGHT: 67 IN | BODY MASS INDEX: 30.13 KG/M2 | WEIGHT: 192 LBS

## 2025-08-08 DIAGNOSIS — M17.11 UNILATERAL PRIMARY OSTEOARTHRITIS, RIGHT KNEE: ICD-10-CM

## 2025-08-08 DIAGNOSIS — M17.0 BILATERAL PRIMARY OSTEOARTHRITIS OF KNEE: ICD-10-CM

## 2025-08-08 PROCEDURE — 20610 DRAIN/INJ JOINT/BURSA W/O US: CPT | Mod: RT

## 2025-08-08 PROCEDURE — 73564 X-RAY EXAM KNEE 4 OR MORE: CPT | Mod: RT

## 2025-08-08 PROCEDURE — 99214 OFFICE O/P EST MOD 30 MIN: CPT | Mod: 25

## 2025-08-08 RX ORDER — CELECOXIB 200 MG/1
200 CAPSULE ORAL
Qty: 30 | Refills: 2 | Status: ACTIVE | COMMUNITY
Start: 2025-08-08 | End: 1900-01-01

## 2025-09-05 ENCOUNTER — APPOINTMENT (OUTPATIENT)
Dept: ORTHOPEDIC SURGERY | Facility: CLINIC | Age: 75
End: 2025-09-05
Payer: MEDICARE

## 2025-09-05 VITALS — BODY MASS INDEX: 29.35 KG/M2 | WEIGHT: 187 LBS | HEIGHT: 67 IN

## 2025-09-05 DIAGNOSIS — M17.11 UNILATERAL PRIMARY OSTEOARTHRITIS, RIGHT KNEE: ICD-10-CM

## 2025-09-05 DIAGNOSIS — Z96.652 PRESENCE OF LEFT ARTIFICIAL KNEE JOINT: ICD-10-CM

## 2025-09-05 PROCEDURE — G2211 COMPLEX E/M VISIT ADD ON: CPT

## 2025-09-05 PROCEDURE — 99214 OFFICE O/P EST MOD 30 MIN: CPT

## (undated) DEVICE — SYR LUER LOK 50CC

## (undated) DEVICE — DRSG DERMABOND 0.7ML

## (undated) DEVICE — DRAPE SURGICAL #1010

## (undated) DEVICE — MAKO BLADE STANDARD

## (undated) DEVICE — DRAPE 3/4 SHEET 52X76"

## (undated) DEVICE — DRAPE U (CLEAR) 47 X 51"

## (undated) DEVICE — STRYKER PULSE LAVAGE WITH HIGH FLOW TIP

## (undated) DEVICE — NDL HYPO SAFE 18G X 1.5" (PINK)

## (undated) DEVICE — DRSG WEBRIL 6"

## (undated) DEVICE — BAG DECANTER 2

## (undated) DEVICE — SOL IRR POUR NS 0.9% 1000ML

## (undated) DEVICE — POSITIONER CARDIAC BUMP

## (undated) DEVICE — SUT PDO 2 1/2 CIRCLE 40MM NDL 45CM

## (undated) DEVICE — DRSG CURITY GAUZE SPONGE 4 X 4" 12-PLY

## (undated) DEVICE — DRSG AQUACEL 3.5 X 14"

## (undated) DEVICE — SUT VICRYL 1 27" CPX UNDYED

## (undated) DEVICE — NDL HYPO SAFE 22G X 1.5" (BLACK)

## (undated) DEVICE — GLV 8.5 PROTEXIS (WHITE)

## (undated) DEVICE — PREP BETADINE POUCH 0.75OZ

## (undated) DEVICE — MAKO DRAPE KIT

## (undated) DEVICE — SUT QUILL MONODERM 2-0 3/8 CIRCLE 45CM

## (undated) DEVICE — TOURNIQUET CUFF 34" DUAL PORT W PLC

## (undated) DEVICE — SYR LUER LOK 5CC

## (undated) DEVICE — GLV 8.5 PROTEXIS (BLUE)

## (undated) DEVICE — SOL IRR POUR H2O 1000ML

## (undated) DEVICE — MAKO VIZADISC KNEE TRACKING KIT

## (undated) DEVICE — SYR LUER LOK 3CC

## (undated) DEVICE — SAW BLADE STRYKER SAGITTAL EXTRA WIDE THIN SHORT

## (undated) DEVICE — PACK MIS KNEE (1 PIECE)

## (undated) DEVICE — SOL INJ NS 0.9% 100ML

## (undated) DEVICE — SUT QUILL MONODERM 0 1/2 CIRCLE TAPR 45CM 26MM

## (undated) DEVICE — NOZZLE TIB 90 DEG

## (undated) DEVICE — HOOD FLYTE STRYKER HELMET SHIELD

## (undated) DEVICE — WARMING BLANKET UPPER ADULT

## (undated) DEVICE — DRSG PREVENA PEEL & PLACE KIT 20CM

## (undated) DEVICE — HOOD FLYTE STRYKER SURGICOOL W PEELAWAY

## (undated) DEVICE — MAKO CHECKPOINT KIT FEMORAL / TIBIAL

## (undated) DEVICE — SAW BLADE STRYKER SAGITTAL 81.5X12.5X1.19MM

## (undated) DEVICE — SPECIMEN CONTAINER 100ML

## (undated) DEVICE — SYR LUER LOK 20CC

## (undated) DEVICE — Device

## (undated) DEVICE — VENODYNE/SCD SLEEVE CALF LARGE

## (undated) DEVICE — SOL IRR POUR NS 0.9% 500ML

## (undated) DEVICE — ELCTR BOVIE PENCIL SMOKE EVACUATION

## (undated) DEVICE — TUBING TUR 2 PRONG

## (undated) DEVICE — SUT STRATAFIX SYMMETRIC PDS PLUS 1 18" CTX VIOLET